# Patient Record
Sex: MALE | Race: WHITE | NOT HISPANIC OR LATINO
[De-identification: names, ages, dates, MRNs, and addresses within clinical notes are randomized per-mention and may not be internally consistent; named-entity substitution may affect disease eponyms.]

---

## 2021-08-19 ENCOUNTER — APPOINTMENT (OUTPATIENT)
Dept: SURGICAL ONCOLOGY | Facility: CLINIC | Age: 71
End: 2021-08-19
Payer: MEDICARE

## 2021-08-19 VITALS
TEMPERATURE: 97.4 F | WEIGHT: 222 LBS | HEART RATE: 92 BPM | SYSTOLIC BLOOD PRESSURE: 137 MMHG | RESPIRATION RATE: 16 BRPM | HEIGHT: 71 IN | OXYGEN SATURATION: 96 % | BODY MASS INDEX: 31.08 KG/M2 | DIASTOLIC BLOOD PRESSURE: 90 MMHG

## 2021-08-19 DIAGNOSIS — Z78.9 OTHER SPECIFIED HEALTH STATUS: ICD-10-CM

## 2021-08-19 PROCEDURE — 99204 OFFICE O/P NEW MOD 45 MIN: CPT

## 2021-08-19 RX ORDER — B-COMPLEX WITH VITAMIN C
TABLET ORAL
Refills: 0 | Status: ACTIVE | COMMUNITY

## 2021-08-19 RX ORDER — CHOLECALCIFEROL (VITAMIN D3) 25 MCG
TABLET ORAL
Refills: 0 | Status: ACTIVE | COMMUNITY

## 2021-08-19 RX ORDER — TAMSULOSIN HYDROCHLORIDE 0.4 MG/1
0.4 CAPSULE ORAL
Refills: 0 | Status: ACTIVE | COMMUNITY

## 2021-08-19 RX ORDER — COLD-HOT PACK
EACH MISCELLANEOUS
Refills: 0 | Status: ACTIVE | COMMUNITY

## 2021-08-19 RX ORDER — FINASTERIDE 5 MG/1
5 TABLET, FILM COATED ORAL
Refills: 0 | Status: ACTIVE | COMMUNITY

## 2021-08-26 ENCOUNTER — OUTPATIENT (OUTPATIENT)
Dept: OUTPATIENT SERVICES | Facility: HOSPITAL | Age: 71
LOS: 1 days | End: 2021-08-26
Payer: COMMERCIAL

## 2021-08-26 ENCOUNTER — RESULT REVIEW (OUTPATIENT)
Age: 71
End: 2021-08-26

## 2021-08-26 DIAGNOSIS — C43.59 MALIGNANT MELANOMA OF OTHER PART OF TRUNK: ICD-10-CM

## 2021-08-26 PROCEDURE — 88321 CONSLTJ&REPRT SLD PREP ELSWR: CPT

## 2021-08-26 NOTE — PHYSICAL EXAM
[Normal] : supple, no neck mass and thyroid not enlarged [Normal Neck Lymph Nodes] : normal neck lymph nodes  [Normal Supraclavicular Lymph Nodes] : normal supraclavicular lymph nodes [Normal Axillary Lymph Nodes] : normal axillary lymph nodes [Normal] : oriented to person, place and time, with appropriate affect [de-identified] : healed right chest wall shave biopsy site with no residual pigmentation

## 2021-08-26 NOTE — CONSULT LETTER
[Dear  ___] : Dear  [unfilled], [Consult Letter:] : I had the pleasure of evaluating your patient, [unfilled]. [Please see my note below.] : Please see my note below. [Consult Closing:] : Thank you very much for allowing me to participate in the care of this patient.  If you have any questions, please do not hesitate to contact me. [Sincerely,] : Sincerely, [FreeTextEntry3] : Cesar Watters MD, MPH, FACS, FSSO\par , [facility] General Surgical Oncology Fellowship\par [facility] [place]\par Associate Professor of Surgery\par [employer] at [facility]

## 2021-08-26 NOTE — HISTORY OF PRESENT ILLNESS
[de-identified] : Mr. DIXON SAUCEDA  is a 70 year  old male  presenting for an initial consultation for newly diagnosed melanoma, referred by Dr. Jean Marie Baez. \par \par Patient underwent a routine skin check with Dr. Baez and underwent a shave biopsy of a right rib cage skin lesion on 2021.  Pathology revealed malignant melanoma, superficial spreading type, 0.4 mm, mitotic rate 0/mm2, non-brisk lymphoid infiltrate, no ulceration or regression.  The lesion extends to the base and peripheral margins of the specimen. Tumor stage: pT1a\par \par Mr. SAUCEDA reports a longstanding history of sun exposure without the use of sunblock. History of sunburns.  Denies any other concerning lesions or masses. Denies bleeding or pruritic moles. Denies pain or constitutional symptoms.\par \par He is retired [employer] (retired in ) and now works for the [employer].  Pt. was involved at [employer]. \par \par PMH: [place] (occasionally wears hearing aids),asthma, DANA (on CPAP), GERD, BPH, BCC (did not require Mohs or excision). \par PSH: None\par Social Hx: , , 2 boys, 3 grandchildren, never a smoker, social alcohol use. \par Family Hx: Father with lung cancer ( @ age 51)- had exposure to asbestos; Son with prostate cancer @ age 45 (A&W); Brother with lung cancer (smoker- );  Another brother with bladder cancer (); Another brother with bladder vs. prostate cancer (); Sister with kidney cancer (A&W).  \par \par Derm: Dr. Jean Marie Baez

## 2021-08-26 NOTE — ASSESSMENT
[FreeTextEntry1] : Mr. DIXON SAUCEDA  is a 70 year  old male with PMHx  BCC, BPH, DANA, Asthma who underwent a right rib cage shave biopsy on 7/30/2021 revealing Malignant melanoma, superficial spreading type, 0.4 mm, mitotic rate 0/mm2, non-brisk lymphoid infiltrate, no ulceration or regression.  The lesion extends to the base and peripheral margins of the specimen. Tumor stage: pT1a\par \par PLAN:\par Wide local excision of right rib cage T1a melanoma.  We discussed the risks, benefits, and alternatives of the procedure with the patient, he expressed understanding and agree to proceed.

## 2021-08-30 LAB — SURGICAL PATHOLOGY STUDY: SIGNIFICANT CHANGE UP

## 2021-09-14 ENCOUNTER — LABORATORY RESULT (OUTPATIENT)
Age: 71
End: 2021-09-14

## 2021-09-14 ENCOUNTER — APPOINTMENT (OUTPATIENT)
Dept: SURGICAL ONCOLOGY | Facility: CLINIC | Age: 71
End: 2021-09-14
Payer: COMMERCIAL

## 2021-09-14 VITALS — DIASTOLIC BLOOD PRESSURE: 95 MMHG | SYSTOLIC BLOOD PRESSURE: 152 MMHG | HEART RATE: 74 BPM | OXYGEN SATURATION: 97 %

## 2021-09-14 PROCEDURE — 14000 TIS TRNFR TRUNK 10 SQ CM/<: CPT

## 2021-09-23 ENCOUNTER — APPOINTMENT (OUTPATIENT)
Dept: SURGICAL ONCOLOGY | Facility: CLINIC | Age: 71
End: 2021-09-23
Payer: MEDICARE

## 2021-09-23 VITALS
HEIGHT: 71 IN | TEMPERATURE: 97.6 F | HEART RATE: 73 BPM | WEIGHT: 224 LBS | DIASTOLIC BLOOD PRESSURE: 89 MMHG | BODY MASS INDEX: 31.36 KG/M2 | SYSTOLIC BLOOD PRESSURE: 148 MMHG | RESPIRATION RATE: 16 BRPM | OXYGEN SATURATION: 95 %

## 2021-09-23 DIAGNOSIS — T81.40XA INFECTION FOLLOWING A PROCEDURE, UNSPECIFIED, INITIAL ENCOUNTER: ICD-10-CM

## 2021-09-23 PROCEDURE — 99024 POSTOP FOLLOW-UP VISIT: CPT

## 2021-09-29 NOTE — PHYSICAL EXAM
[Normal] : well developed, well nourished, in no acute distress [de-identified] : Right chest wall incision with erythema with scant discharge; small area of wound dehiscence to the midline portion of the wound; sutures removed

## 2021-09-29 NOTE — ASSESSMENT
[FreeTextEntry1] : Mr. DIXON SAUCEDA  is a 70 year  old male with PMHx  BCC, BPH, DANA, Asthma who underwent a right rib cage shave biopsy on 7/30/2021 revealing Malignant melanoma, superficial spreading type, 0.4 mm, mitotic rate 0/mm2, non-brisk lymphoid infiltrate, no ulceration or regression.  The lesion extends to the base and peripheral margins of the specimen. Tumor stage: pT1a\par \par ****OFFICE PROCEDURE 9/14/2021-  S/p WLE of right rib cage T1 (0.4 mm) melanoma. \par ***FINAL PATHOLOGY - Scar secondary to previous procedure with no residual melanocytic lesion.  Additional medial and lateral margins negative for tumor. \par \par Postoperative cellulitis \par \par PLAN:\par 1) Augmentin 875/125 mg BID x 10 days \par 2) RTO 10-14 days for re-evaluation\par 3) Continue f/u with dermatology for full body skin checks\par

## 2021-09-29 NOTE — HISTORY OF PRESENT ILLNESS
[de-identified] : Mr. DIXON SAUCEDA  is a 70 year  old male  presenting for a follow up visit. \par Pt. was seen in initial consultation on 2021 for newly diagnosed melanoma, referred by Dr. Jean Marie Baez. \par \par Patient underwent a routine skin check with Dr. Baez and underwent a shave biopsy of a right rib cage skin lesion on 2021.  Pathology revealed malignant melanoma, superficial spreading type, 0.4 mm, mitotic rate 0/mm2, non-brisk lymphoid infiltrate, no ulceration or regression.  The lesion extends to the base and peripheral margins of the specimen. Tumor stage: pT1a\par \par Mr. SAUCEDA reports a longstanding history of sun exposure without the use of sunblock. History of sunburns.  Denies any other concerning lesions or masses. Denies bleeding or pruritic moles. Denies pain or constitutional symptoms.\par \par He is retired [employer] (retired in ) and now works for the [employer].  Pt. was involved at [employer]. \par \par PMH: [place] (occasionally wears hearing aids),asthma, DANA (on CPAP), GERD, BPH, BCC (did not require Mohs or excision). \par PSH: None\par Social Hx: , , 2 boys, 3 grandchildren, never a smoker, social alcohol use. \par Family Hx: Father with lung cancer ( @ age 51)- had exposure to asbestos; Son with prostate cancer @ age 45 (A&W); Brother with lung cancer (smoker- );  Another brother with bladder cancer (); Another brother with bladder vs. prostate cancer (); Sister with kidney cancer (A&W).  \par \par Derm: Dr. Jean Marie Baez\par \par INTERVAL HISTORY:\par ****OFFICE PROCEDURE 2021-  S/p WLE of right rib cage T1 (0.4 mm) melanoma. \par ***FINAL PATHOLOGY - Scar secondary to previous procedure with no residual melanocytic lesion.  Additional medial and lateral margins negative for tumor. \par \par 2021- Denies pain, fever or chills. Reports redness at the incision site.

## 2021-09-29 NOTE — CONSULT LETTER
[Dear  ___] : Dear  [unfilled], [Consult Letter:] : I had the pleasure of evaluating your patient, [unfilled]. [Please see my note below.] : Please see my note below. [Consult Closing:] : Thank you very much for allowing me to participate in the care of this patient.  If you have any questions, please do not hesitate to contact me. [Sincerely,] : Sincerely, [FreeTextEntry3] : Cesar Wattesr MD, MPH, FACS, FSSO\par , [facility] General Surgical Oncology Fellowship\par [facility] [place]\par Associate Professor of Surgery\par [employer] at [facility]

## 2021-09-30 NOTE — PROCEDURE
[Excision Of Lesion] : excision of lesion [Patient] : patient [Risks] : risks [Benefits] : benefits [Alternatives] : alternatives [___ ml Inj] : [unfilled] ~Uml [1%] : 1% [With Epi] : with epinephrine [Jorge] : using Jorge [Excisional: Margin ___mm] : the lesion was excised with a  [unfilled] ~Umm margin in an elliptical fashion [Cautery] : cautery [Vicryl] : Vicryl suture(s) [___ # of Sutures] : [unfilled] [Size: ___-0] : [unfilled]-0 [Running] : running [Sent to Pathology] : the excised lesion was place in buffered formalin and sent for pathology [Tolerated Well] : the patient tolerated the procedure well [No Complications] : there were no complications [___ Week(s)] : in [unfilled] week(s) [de-identified] : melanoma right rib cage [FreeTextEntry5] : right rib cage melanoma 2.5 x 1.8 cm [de-identified] : tissue was undermined and flaps were rotated into the incision to allow for a tension free closure of dermis and epidermis - total area 9.9 sq cm [de-identified] : right rib cage melanoma short superior long lateral

## 2021-10-07 ENCOUNTER — APPOINTMENT (OUTPATIENT)
Dept: SURGICAL ONCOLOGY | Facility: CLINIC | Age: 71
End: 2021-10-07
Payer: MEDICARE

## 2021-10-07 VITALS
HEART RATE: 66 BPM | DIASTOLIC BLOOD PRESSURE: 88 MMHG | HEIGHT: 71 IN | OXYGEN SATURATION: 97 % | BODY MASS INDEX: 31.08 KG/M2 | SYSTOLIC BLOOD PRESSURE: 148 MMHG | RESPIRATION RATE: 16 BRPM | WEIGHT: 222 LBS | TEMPERATURE: 97.8 F

## 2021-10-07 PROCEDURE — 99024 POSTOP FOLLOW-UP VISIT: CPT

## 2021-10-14 NOTE — PHYSICAL EXAM
[Normal] : well developed, well nourished, in no acute distress [de-identified] : Right chest wall incision with small area of wound dehiscence to the midline portion of the wound; no redness or discharge

## 2021-10-14 NOTE — HISTORY OF PRESENT ILLNESS
[de-identified] : Mr. DIXON SAUCEDA  is a 70 year  old male  presenting for a follow up visit. \par Pt. was seen in initial consultation on 2021 for newly diagnosed melanoma, referred by Dr. Jean Marie Baez. \par \par Patient underwent a routine skin check with Dr. Baez and underwent a shave biopsy of a right rib cage skin lesion on 2021.  Pathology revealed malignant melanoma, superficial spreading type, 0.4 mm, mitotic rate 0/mm2, non-brisk lymphoid infiltrate, no ulceration or regression.  The lesion extends to the base and peripheral margins of the specimen. Tumor stage: pT1a\par \par Mr. SAUCEDA reports a longstanding history of sun exposure without the use of sunblock. History of sunburns.  Denies any other concerning lesions or masses. Denies bleeding or pruritic moles. Denies pain or constitutional symptoms.\par \par He is retired [employer] (retired in ) and now works for the [employer].  Pt. was involved at [facility] [employer] [facility]. \par \par PMH: [place] (occasionally wears hearing aids),asthma, DANA (on CPAP), GERD, BPH, BCC (did not require Mohs or excision). \par PSH: None\par Social Hx: , , 2 boys, 3 grandchildren, never a smoker, social alcohol use. \par Family Hx: Father with lung cancer ( @ age 51)- had exposure to asbestos; Son with prostate cancer @ age 45 (A&W); Brother with lung cancer (smoker- );  Another brother with bladder cancer (); Another brother with bladder vs. prostate cancer (); Sister with kidney cancer (A&W).  \par \par Derm: Dr. Jean Marie Baez\par \par INTERVAL HISTORY:\par ****OFFICE PROCEDURE 2021-  S/p WLE of right rib cage T1 (0.4 mm) melanoma. \par ***FINAL PATHOLOGY - Scar secondary to previous procedure with no residual melanocytic lesion.  Additional medial and lateral margins negative for tumor. \par \par 2021- Denies pain, fever or chills. Reports redness at the incision site. \par \par 10/7/2021- Completed Augmentin with improvement.  States there is an open area in the wound which drains scant yellow drainage. Denies fever or chills.

## 2021-10-14 NOTE — ASSESSMENT
[FreeTextEntry1] : Mr. DIXON SAUCEDA  is a 70 year  old male with PMHx  BCC, BPH, DANA, Asthma who underwent a right rib cage shave biopsy on 7/30/2021 revealing Malignant melanoma, superficial spreading type, 0.4 mm, mitotic rate 0/mm2, non-brisk lymphoid infiltrate, no ulceration or regression.  The lesion extends to the base and peripheral margins of the specimen. Tumor stage: pT1a\par \par ****OFFICE PROCEDURE 9/14/2021-  S/p WLE of right rib cage T1 (0.4 mm) melanoma. \par ***FINAL PATHOLOGY - Scar secondary to previous procedure with no residual melanocytic lesion.  Additional medial and lateral margins negative for tumor. \par \par Postoperative cellulitis - now resolved. \par Small area of wound dehiscence. \par \par PLAN:\par 1) RTO in 3 months then Q6M\par 2) Continue f/u with dermatology for full body skin checks\par

## 2021-10-14 NOTE — CONSULT LETTER
[Dear  ___] : Dear  [unfilled], [Courtesy Letter:] : I had the pleasure of seeing your patient, [unfilled], in my office today. [Please see my note below.] : Please see my note below. [Consult Closing:] : Thank you very much for allowing me to participate in the care of this patient.  If you have any questions, please do not hesitate to contact me. [Sincerely,] : Sincerely, [FreeTextEntry3] : Cesar Watters MD, MPH, FACS, FSSO\par , [facility] General Surgical Oncology Fellowship\par [facility] [place]\par Associate Professor of Surgery\par [employer] at [facility]

## 2022-01-06 ENCOUNTER — APPOINTMENT (OUTPATIENT)
Dept: SURGICAL ONCOLOGY | Facility: CLINIC | Age: 72
End: 2022-01-06
Payer: COMMERCIAL

## 2022-01-06 VITALS
OXYGEN SATURATION: 95 % | WEIGHT: 224 LBS | HEIGHT: 71 IN | BODY MASS INDEX: 31.36 KG/M2 | RESPIRATION RATE: 16 BRPM | TEMPERATURE: 97.4 F | SYSTOLIC BLOOD PRESSURE: 150 MMHG | HEART RATE: 65 BPM | DIASTOLIC BLOOD PRESSURE: 90 MMHG

## 2022-01-06 PROCEDURE — 99213 OFFICE O/P EST LOW 20 MIN: CPT

## 2022-01-06 NOTE — PHYSICAL EXAM
[Normal] : supple, no neck mass and thyroid not enlarged [Normal Neck Lymph Nodes] : normal neck lymph nodes  [Normal Supraclavicular Lymph Nodes] : normal supraclavicular lymph nodes [Normal Axillary Lymph Nodes] : normal axillary lymph nodes [Normal] : oriented to person, place and time, with appropriate affect [de-identified] : Healed, hypertrophic right chest wall incision with no evidence of recurrence

## 2022-01-06 NOTE — CONSULT LETTER
[Dear  ___] : Dear  [unfilled], [Courtesy Letter:] : I had the pleasure of seeing your patient, [unfilled], in my office today. [Please see my note below.] : Please see my note below. [Consult Closing:] : Thank you very much for allowing me to participate in the care of this patient.  If you have any questions, please do not hesitate to contact me. [Sincerely,] : Sincerely, [FreeTextEntry3] : Cesar Watters MD, MPH, FACS, FSSO\par , [facility] General Surgical Oncology Fellowship\par [facility] [place]\par Associate Professor of Surgery\par [employer] at [facility]

## 2022-01-06 NOTE — HISTORY OF PRESENT ILLNESS
[de-identified] : Mr. DIXON SAUCEDA  is a 71 year  old male  presenting for a 3 month follow up visit. \par Pt. was seen in initial consultation on 2021 for newly diagnosed melanoma, referred by Dr. Jean Marie Baez. \par \par Patient underwent a routine skin check with Dr. Baez and underwent a shave biopsy of a right rib cage skin lesion on 2021.  Pathology revealed malignant melanoma, superficial spreading type, 0.4 mm, mitotic rate 0/mm2, non-brisk lymphoid infiltrate, no ulceration or regression.  The lesion extends to the base and peripheral margins of the specimen. Tumor stage: pT1a\par \par Mr. SAUCEDA reports a longstanding history of sun exposure without the use of sunblock. History of sunburns.  Denies any other concerning lesions or masses. Denies bleeding or pruritic moles. Denies pain or constitutional symptoms.\par \par He is retired [employer] (retired in ) and now works for the [employer].  Pt. was involved at [facility]. \par \par PMH: [place] (occasionally wears hearing aids),asthma, DANA (on CPAP), GERD, BPH, BCC (did not require Mohs or excision). \par PSH: None\par Social Hx: , , 2 boys, 3 grandchildren, never a smoker, social alcohol use. \par Family Hx: Father with lung cancer ( @ age 51)- had exposure to asbestos; Son with prostate cancer @ age 45 (A&W); Brother with lung cancer (smoker- );  Another brother with bladder cancer (); Another brother with bladder vs. prostate cancer (); Sister with kidney cancer (A&W).  \par \par Derm: Dr. Jean Marie Baez\par \par INTERVAL HISTORY:\par ****OFFICE PROCEDURE 2021-  S/p WLE of right rib cage T1 (0.4 mm) melanoma. \par ***FINAL PATHOLOGY - Scar secondary to previous procedure with no residual melanocytic lesion.  Additional medial and lateral margins negative for tumor. \par \par 2021- Denies pain, fever or chills. Reports redness at the incision site. \par \par 10/7/2021- Completed Augmentin with improvement.  States there is an open area in the wound which drains scant yellow drainage. Denies fever or chills. \par \par 2022- Pt. is scheduled to see Dr. Baez next month for a full body skin check.   Denies new skin lesions or masses. No bleeding or pruritic moles.  Doing well.

## 2022-01-06 NOTE — ASSESSMENT
[FreeTextEntry1] : Mr. DIXON SAUCEDA  is a 71 year  old male with PMHx  BCC, BPH, DANA, Asthma who underwent a right rib cage shave biopsy on 7/30/2021 revealing Malignant melanoma, superficial spreading type, 0.4 mm, mitotic rate 0/mm2, non-brisk lymphoid infiltrate, no ulceration or regression.  The lesion extends to the base and peripheral margins of the specimen. Tumor stage: pT1a\par \par ****OFFICE PROCEDURE 9/14/2021-  S/p WLE of right rib cage T1 (0.4 mm) melanoma. \par ***FINAL PATHOLOGY - Scar secondary to previous procedure with no residual melanocytic lesion.  Additional medial and lateral margins negative for tumor. \par \par Postoperative cellulitis - now resolved. \par No evidence of recurrence \par \par PLAN:\par 1) RTO Q6M x 5 years \par 2) Continue f/u with dermatology for full body skin checks (scheduled to see Dr. Baez next month). \par

## 2022-06-23 ENCOUNTER — APPOINTMENT (OUTPATIENT)
Dept: OTOLARYNGOLOGY | Facility: CLINIC | Age: 72
End: 2022-06-23
Payer: MEDICARE

## 2022-06-23 VITALS
HEIGHT: 71 IN | DIASTOLIC BLOOD PRESSURE: 87 MMHG | WEIGHT: 220 LBS | SYSTOLIC BLOOD PRESSURE: 157 MMHG | HEART RATE: 52 BPM | BODY MASS INDEX: 30.8 KG/M2

## 2022-06-23 DIAGNOSIS — H69.82 OTHER SPECIFIED DISORDERS OF EUSTACHIAN TUBE, LEFT EAR: ICD-10-CM

## 2022-06-23 DIAGNOSIS — H90.3 SENSORINEURAL HEARING LOSS, BILATERAL: ICD-10-CM

## 2022-06-23 PROCEDURE — 99203 OFFICE O/P NEW LOW 30 MIN: CPT

## 2022-06-23 PROCEDURE — 92557 COMPREHENSIVE HEARING TEST: CPT

## 2022-06-23 PROCEDURE — 92567 TYMPANOMETRY: CPT

## 2022-06-23 RX ORDER — VITAMIN B COMPLEX
CAPSULE ORAL
Refills: 0 | Status: ACTIVE | COMMUNITY

## 2022-06-23 RX ORDER — NACL/NAHCO3/HYALURON SOD/ALOE 0.9 %
SPRAY GEL (ML) NASAL
Qty: 90 | Refills: 0 | Status: ACTIVE | COMMUNITY
Start: 2022-04-15

## 2022-06-23 RX ORDER — ALPRAZOLAM 0.25 MG/1
0.25 TABLET ORAL
Qty: 30 | Refills: 0 | Status: ACTIVE | COMMUNITY
Start: 2022-06-16

## 2022-06-23 RX ORDER — ASCORBIC ACID 500 MG
TABLET ORAL
Refills: 0 | Status: ACTIVE | COMMUNITY

## 2022-06-23 RX ORDER — AMOXICILLIN AND CLAVULANATE POTASSIUM 875; 125 MG/1; MG/1
875-125 TABLET, COATED ORAL
Qty: 20 | Refills: 0 | Status: DISCONTINUED | COMMUNITY
Start: 2021-09-23 | End: 2022-06-23

## 2022-06-23 RX ORDER — OMEPRAZOLE 20 MG/1
TABLET, DELAYED RELEASE ORAL
Refills: 0 | Status: ACTIVE | COMMUNITY

## 2022-06-23 RX ORDER — FLUTICASONE PROPIONATE 50 MCG
SPRAY, SUSPENSION NASAL
Refills: 0 | Status: ACTIVE | COMMUNITY

## 2022-06-23 RX ORDER — TADALAFIL 5 MG/1
5 TABLET ORAL
Qty: 30 | Refills: 0 | Status: ACTIVE | COMMUNITY
Start: 2022-06-16

## 2022-06-23 RX ORDER — PSYLLIUM HUSK 0.4 G
CAPSULE ORAL
Refills: 0 | Status: ACTIVE | COMMUNITY

## 2022-06-23 NOTE — REVIEW OF SYSTEMS
[Seasonal Allergies] : seasonal allergies [Post Nasal Drip] : post nasal drip [Ear Pain] : ear pain [Ear Itch] : ear itch [Hearing Loss] : hearing loss [Ear Noises] : ear noises [Nasal Congestion] : nasal congestion [Problem Snoring] : problem snoring [Sinus Pain] : sinus pain [Sinus Pressure] : sinus pressure [Snoring With Pauses] : snoring with pauses [Throat Clearing] : throat clearing [Heartburn] : heartburn [Joint Pain] : joint pain [Negative] : Heme/Lymph [As Noted in HPI] : as noted in HPI [FreeTextEntry1] : daytime sleepiness

## 2022-06-23 NOTE — DATA REVIEWED
[de-identified] : \par -TYMPS: TYPE A AU (ETF WNL AU)\par -HEARING ESSENTIALLY -1000 HZ, SLOPING TO A MODERATE TO SEVERE SNHL 1372-6375 HZ AU \par RECS: 1) ENT F/U 2)RE-EVAL AS PER MD 3)CONTINUE USE OF BINAURAL AMP/ HAC AT OUTSIDE FACILITY

## 2022-06-23 NOTE — ASSESSMENT
[FreeTextEntry1] :  WNL sloping to mod to severe SNHL w type A AU\par f/u at [facility] re: HA\par \par advised to use flonase daily\par f/u prn failure to imorive

## 2022-06-23 NOTE — HISTORY OF PRESENT ILLNESS
[de-identified] : 71 yr old male with bubbling noise AD for 7-10 days until yesterday. Feels better except for residual pressure\par aided AU from [employer]\par denies recent URI, allergy flare up\par uses Flonase prn, NS daily\par -otalgia, dizzy\par \par +Qtips\par \par -hx otitis, noise exp, head trauma, FH\par \par

## 2022-07-14 ENCOUNTER — APPOINTMENT (OUTPATIENT)
Dept: SURGICAL ONCOLOGY | Facility: CLINIC | Age: 72
End: 2022-07-14

## 2022-08-10 ENCOUNTER — APPOINTMENT (OUTPATIENT)
Dept: ORTHOPEDIC SURGERY | Facility: CLINIC | Age: 72
End: 2022-08-10

## 2022-08-10 VITALS — WEIGHT: 212 LBS | HEIGHT: 71 IN | BODY MASS INDEX: 29.68 KG/M2

## 2022-08-10 DIAGNOSIS — Z00.00 ENCOUNTER FOR GENERAL ADULT MEDICAL EXAMINATION W/OUT ABNORMAL FINDINGS: ICD-10-CM

## 2022-08-10 DIAGNOSIS — Z78.9 OTHER SPECIFIED HEALTH STATUS: ICD-10-CM

## 2022-08-10 PROCEDURE — 73030 X-RAY EXAM OF SHOULDER: CPT | Mod: RT

## 2022-08-10 PROCEDURE — 99204 OFFICE O/P NEW MOD 45 MIN: CPT

## 2022-08-10 PROCEDURE — 73010 X-RAY EXAM OF SHOULDER BLADE: CPT | Mod: RT

## 2022-08-10 RX ORDER — METHYLPREDNISOLONE 4 MG/1
4 TABLET ORAL
Qty: 1 | Refills: 0 | Status: ACTIVE | COMMUNITY
Start: 2022-08-10 | End: 1900-01-01

## 2022-08-10 NOTE — ASSESSMENT
[FreeTextEntry1] : We reviewed the findings and his options.\par PT is prescribed.\par MDP is prescribed. \par If symptoms persist, an MRI may be considered. \par His questions were answered.\par \par Patient seen by Dayday Mathew M.D.\par Entered by Mara Ly acting as scribe.

## 2022-08-10 NOTE — PHYSICAL EXAM
[Right] : right shoulder [Left] : left shoulder [Mild] : mild [5 ___] : forward flexion 5[unfilled]/5 [5___] : external rotation 5[unfilled]/5 [Sitting] : sitting [] : no ecchymosis [FreeTextEntry9] : IR to T10. [TWNoteComboBox6] : internal rotation L1 [TWNoteComboBox4] : passive forward flexion 165 degrees [de-identified] : external rotation 75 degrees

## 2022-08-10 NOTE — IMAGING
[Right] : right shoulder [FreeTextEntry1] : The GH joint is OK.  There are AC and GT changes. [FreeTextEntry5] : There is a Type II acromion.

## 2022-08-10 NOTE — HISTORY OF PRESENT ILLNESS
[4] : 4 [5] : 5 [Dull/Aching] : dull/aching [Intermittent] : intermittent [Sleep] : sleep [Retired] : Work status: retired [de-identified] : Right shoulder pain for 1 month, no known injury. [] : no [FreeTextEntry1] : Right shoulder [de-identified] : lifting

## 2022-08-10 NOTE — CONSULT LETTER
[Dear  ___] : Dear  [unfilled], [Consult Letter:] : I had the pleasure of evaluating your patient, [unfilled]. [Please see my note below.] : Please see my note below. [Consult Closing:] : Thank you very much for allowing me to participate in the care of this patient.  If you have any questions, please do not hesitate to contact me. [Sincerely,] : Sincerely, [FreeTextEntry3] : Dayday Berrios M.D.\par Shoulder Surgery

## 2022-08-11 ENCOUNTER — APPOINTMENT (OUTPATIENT)
Dept: SURGICAL ONCOLOGY | Facility: CLINIC | Age: 72
End: 2022-08-11

## 2022-08-11 VITALS
RESPIRATION RATE: 16 BRPM | SYSTOLIC BLOOD PRESSURE: 144 MMHG | WEIGHT: 211 LBS | HEIGHT: 71 IN | OXYGEN SATURATION: 96 % | DIASTOLIC BLOOD PRESSURE: 80 MMHG | TEMPERATURE: 97.4 F | BODY MASS INDEX: 29.54 KG/M2 | HEART RATE: 77 BPM

## 2022-08-11 PROCEDURE — 99213 OFFICE O/P EST LOW 20 MIN: CPT

## 2022-09-21 ENCOUNTER — APPOINTMENT (OUTPATIENT)
Dept: ORTHOPEDIC SURGERY | Facility: CLINIC | Age: 72
End: 2022-09-21

## 2022-09-21 VITALS — BODY MASS INDEX: 29.54 KG/M2 | WEIGHT: 211 LBS | HEIGHT: 71 IN

## 2022-09-21 PROCEDURE — 99214 OFFICE O/P EST MOD 30 MIN: CPT

## 2022-09-21 NOTE — HISTORY OF PRESENT ILLNESS
[Dull/Aching] : dull/aching [Retired] : Work status: retired [4] : 4 [5] : 5 [Intermittent] : intermittent [Sleep] : sleep [de-identified] : Returns for f/up Right shoulder pain since July, no known injury.  He has had 8 sessions of PT, motion and night symptoms improving.  [] : no [FreeTextEntry1] : Right shoulder [de-identified] : lifting [de-identified] : Physical therapy 2-3 x a week, HEP.

## 2022-09-21 NOTE — ASSESSMENT
[FreeTextEntry1] : We reviewed the findings and his options.\par Continue PT as prescribed. He reports 50% improvement in pain and symptoms.\par Alleve bid with a meal x 2 weeks.\par MDP 8/10/22 with +/- relief. \par If symptoms persist, an MRI may be considered. \par His questions were answered.\par \par Patient seen by Dayday Mathew M.D.\par Progress note completed by DANA Loyd

## 2022-09-21 NOTE — REASON FOR VISIT
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  He has been doing a lot of work, including lifting and carrying.  Sleeping can be sore.  Reaching is uncomfortable.  OTC meds don't help much.  No numbness. With PT sessions he feels 50% better.

## 2022-09-21 NOTE — PHYSICAL EXAM
[Right] : right shoulder [Mild] : mild [4 ___] : forward flexion 4[unfilled]/5 [4___] : external rotation 4[unfilled]/5 [Left] : left shoulder [Sitting] : sitting [] : no ecchymosis [FreeTextEntry9] : IR to T10. [TWNoteComboBox6] : False [TWNoteComboBox4] : passive forward flexion 165 degrees [de-identified] : external rotation 75 degrees

## 2022-09-26 NOTE — CONSULT LETTER
[FreeTextEntry3] : Cesar Watters MD, MPH, FACS, FSSO\par , [facility] General Surgical Oncology Fellowship\par [facility] [place]\par Associate Professor of Surgery\par [employer] at [facility]

## 2022-09-26 NOTE — HISTORY OF PRESENT ILLNESS
[de-identified] : Mr. DIXON SAUCEDA  is a 71 year  old male  presenting for a 3 month follow up visit. \par Pt. was seen in initial consultation on 2021 for newly diagnosed melanoma, referred by Dr. Jean Marie Baez. \par \par Patient underwent a routine skin check with Dr. Baez and underwent a shave biopsy of a right rib cage skin lesion on 2021.  Pathology revealed malignant melanoma, superficial spreading type, 0.4 mm, mitotic rate 0/mm2, non-brisk lymphoid infiltrate, no ulceration or regression.  The lesion extends to the base and peripheral margins of the specimen. Tumor stage: pT1a\par \par Mr. SAUCEDA reports a longstanding history of sun exposure without the use of sunblock. History of sunburns.  Denies any other concerning lesions or masses. Denies bleeding or pruritic moles. Denies pain or constitutional symptoms.\par \par He is retired [employer] (retired in ) and now works for the [employer].  Pt. was involved at [employer]. \par \par PMH: [place] (occasionally wears hearing aids),asthma, [place] (on CPAP), GERD, BPH, BCC (did not require Mohs or excision). \par PSH: None\par Social Hx: , , 2 boys, 3 grandchildren, never a smoker, social alcohol use. \par Family Hx: Father with lung cancer ( @ age 51)- had exposure to asbestos; Son with prostate cancer @ age 45 (A&W); Brother with lung cancer (smoker- );  Another brother with bladder cancer (); Another brother with bladder vs. prostate cancer (); Sister with kidney cancer (A&W).  \par \par Derm: Dr. Jean Marie Baez\par \par INTERVAL HISTORY:\par ****OFFICE PROCEDURE 2021-  S/p WLE of right rib cage T1 (0.4 mm) melanoma. \par ***FINAL PATHOLOGY - Scar secondary to previous procedure with no residual melanocytic lesion.  Additional medial and lateral margins negative for tumor. \par \par 2021- Denies pain, fever or chills. Reports redness at the incision site. \par \par 10/7/2021- Completed Augmentin with improvement.  States there is an open area in the wound which drains scant yellow drainage. Denies fever or chills. \par \par 2022- Pt. is scheduled to see Dr. Baez next month for a full body skin check.   Denies new skin lesions or masses. No bleeding or pruritic moles.  Doing well. \par \par 22- Continues to see derm every 4 months, last visit in 2022, no new biopsies taken.  Denies any new or changing skin lesions.

## 2022-10-14 ENCOUNTER — APPOINTMENT (OUTPATIENT)
Dept: ORTHOPEDIC SURGERY | Facility: CLINIC | Age: 72
End: 2022-10-14

## 2022-10-14 PROCEDURE — 99214 OFFICE O/P EST MOD 30 MIN: CPT

## 2022-10-14 NOTE — PHYSICAL EXAM
[Right] : right shoulder [4 ___] : forward flexion 4[unfilled]/5 [4___] : external rotation 4[unfilled]/5 [Left] : left shoulder [Sitting] : sitting [Moderate] : moderate [] : no ecchymosis [FreeTextEntry9] : IR to T10. [TWNoteComboBox6] : internal rotation L1 [TWNoteComboBox4] : passive forward flexion 165 degrees [de-identified] : external rotation 75 degrees

## 2022-10-14 NOTE — REASON FOR VISIT
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  He has been doing a lot of work, including lifting and carrying.  Sleeping can be sore.  Reaching is uncomfortable.  OTC meds don't help much.  No numbness.  He did OK with PT, but now feels the same.  He has done the MDP and Aleve.  He continues to have limitations.

## 2022-10-14 NOTE — ASSESSMENT
[FreeTextEntry1] : An MRI is planned.\par He has lost some of the gains he originally achieved.\par HEP planned.\par He may continue the Aleve.\par Stop PT for now.

## 2022-10-18 ENCOUNTER — FORM ENCOUNTER (OUTPATIENT)
Age: 72
End: 2022-10-18

## 2022-10-19 ENCOUNTER — APPOINTMENT (OUTPATIENT)
Dept: MRI IMAGING | Facility: CLINIC | Age: 72
End: 2022-10-19

## 2022-10-19 PROCEDURE — 73221 MRI JOINT UPR EXTREM W/O DYE: CPT | Mod: RT,MH

## 2022-10-24 ENCOUNTER — APPOINTMENT (OUTPATIENT)
Dept: ORTHOPEDIC SURGERY | Facility: CLINIC | Age: 72
End: 2022-10-24

## 2022-10-31 ENCOUNTER — APPOINTMENT (OUTPATIENT)
Dept: ORTHOPEDIC SURGERY | Facility: CLINIC | Age: 72
End: 2022-10-31

## 2022-10-31 VITALS — WEIGHT: 214 LBS | HEIGHT: 71 IN | BODY MASS INDEX: 29.96 KG/M2

## 2022-10-31 PROCEDURE — 20611 DRAIN/INJ JOINT/BURSA W/US: CPT | Mod: RT

## 2022-10-31 PROCEDURE — 99214 OFFICE O/P EST MOD 30 MIN: CPT | Mod: 25

## 2022-10-31 NOTE — ASSESSMENT
[FreeTextEntry1] : We reviewed the MRI findings. \par A SA injection is indicated today. \par He will follow up in 4-6 weeks. \par He will follow up with his wife to discuss surgical options. \par Questions answered. \par \par Patient seen by Dayday Mathew M.D.\par Entered by Mara Ly acting as scribe. \par \par \par Procedure Name: Large Joint Injection / Aspiration: Depomedrol, Lidocaine and Guidance Ultrasound\par \par Large Joint Injection was performed because of pain and inflammation.\par Depomedrol: An injection of Depomedrol 40 mg , 2 cc.\par Lidocaine: An injection of Lidocaine 1 mg , 13 cc.\par \par Medication was injected in the right subacromial space. Patient has tried OTC's including aspirin, Ibuprofen, Aleve etc or prescription NSAIDS, and/or exercises at home and/ or physical therapy without satisfactory response. After verbal consent using sterile preparation and technique. The risks, benefits, and alternatives to cortisone injection were explained in full to the patient. Risks outlined include but are not limited to infection, sepsis, bleeding, scarring, skin discoloration, temporary increase in pain, syncopal episode, failure to resolve symptoms, allergic reaction, symptom recurrence, and elevation of blood sugar in diabetics. Patient understood the risks. All questions were answered. After discussion of options, patient requested an injection. Oral informed consent was obtained and sterile prep was done of the injection site. Sterile technique was utilized for the procedure including the preparation of the solutions used for the injection. Patient tolerated the procedure well. Advised to ice the injection site this evening. Prep with betadine locally to site. Sterile technique used. Patient tolerated procedure well. Post Procedure Instructions: Patient was advised to call if redness, pain, or fever occur and apply ice for 15 min. out of every hour for the next 12-24 hours as tolerated. Patient was advised to rest the joint(s) for 3 days. Ultrasound Guidance was used for the following reasons: for precise injection in area of tear. Visualization of the needle and placement of injection was performed without complication.

## 2022-10-31 NOTE — DATA REVIEWED
[FreeTextEntry1] : MRI 10/19/22: \par The biceps has subluxed into a partial subscapularis tear. The supraspinatus has a high-grade partial bursal tear. The muscle is good. There are AC changes.

## 2022-10-31 NOTE — HISTORY OF PRESENT ILLNESS
[5] : 5 [0] : 0 [de-identified] : pt is here today for a test follow up for his right shoulder. pt states his pain is improving  [FreeTextEntry1] : right shoulder  [de-identified] : home exercises and icing

## 2022-10-31 NOTE — PHYSICAL EXAM
[Right] : right shoulder [Moderate] : moderate [4 ___] : forward flexion 4[unfilled]/5 [4___] : external rotation 4[unfilled]/5 [Left] : left shoulder [Sitting] : sitting [] : no ecchymosis [FreeTextEntry9] : IR to T10. [TWNoteComboBox6] : internal rotation L1 [TWNoteComboBox4] : passive forward flexion 165 degrees [de-identified] : external rotation 75 degrees

## 2022-11-28 ENCOUNTER — APPOINTMENT (OUTPATIENT)
Dept: ORTHOPEDIC SURGERY | Facility: CLINIC | Age: 72
End: 2022-11-28

## 2022-11-28 VITALS — WEIGHT: 216 LBS | HEIGHT: 71 IN | BODY MASS INDEX: 30.24 KG/M2

## 2022-11-28 PROCEDURE — 99214 OFFICE O/P EST MOD 30 MIN: CPT

## 2022-11-28 NOTE — ASSESSMENT
[FreeTextEntry1] : We discussed his course. \par PT is prescribed. \par If symptoms persist, an injection could be considered. \par There are surgical options. \par His and his wife's questions answered. \par \par Patient seen by Dayday Mathew M.D.\par Entered by Mara Ly acting as scribe. \par \par \par

## 2022-11-28 NOTE — PHYSICAL EXAM
[Right] : right shoulder [Moderate] : moderate [4 ___] : forward flexion 4[unfilled]/5 [4___] : external rotation 4[unfilled]/5 [Left] : left shoulder [Sitting] : sitting [] : no ecchymosis [FreeTextEntry9] : IR to T10. [TWNoteComboBox6] : internal rotation L1 [TWNoteComboBox4] : passive forward flexion 165 degrees [de-identified] : external rotation 75 degrees

## 2022-11-28 NOTE — REASON FOR VISIT
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  He has been doing a lot of work, including lifting and carrying.  Sleeping can be sore.  Reaching is uncomfortable.  OTC meds don't help much.  No numbness.  He did OK with PT, but now feels the same.  He has done the MDP and Aleve.  He continues to have limitations. The SA injection from 10/31/22 helped for a few weeks. He has increased his activity levels recently. His wife is here. He feels about 50% better.

## 2022-11-28 NOTE — HISTORY OF PRESENT ILLNESS
[2] : 2 [0] : 0 [de-identified] : pt is here today for a follow up for his right shoulder. pt states his pain is similar to last visit, pt states the cortisone injection provided him with relief for a few weeks. pt states the pain is mainly with the overhead motion  [FreeTextEntry1] : right shoulder  [de-identified] : home exercises

## 2023-03-09 ENCOUNTER — APPOINTMENT (OUTPATIENT)
Dept: SURGICAL ONCOLOGY | Facility: CLINIC | Age: 73
End: 2023-03-09
Payer: COMMERCIAL

## 2023-03-16 ENCOUNTER — APPOINTMENT (OUTPATIENT)
Dept: SURGICAL ONCOLOGY | Facility: CLINIC | Age: 73
End: 2023-03-16
Payer: COMMERCIAL

## 2023-03-16 VITALS
WEIGHT: 225 LBS | DIASTOLIC BLOOD PRESSURE: 80 MMHG | SYSTOLIC BLOOD PRESSURE: 138 MMHG | TEMPERATURE: 97.9 F | HEIGHT: 71 IN | HEART RATE: 100 BPM | OXYGEN SATURATION: 96 % | BODY MASS INDEX: 31.5 KG/M2

## 2023-03-16 DIAGNOSIS — C43.59 MALIGNANT MELANOMA OF OTHER PART OF TRUNK: ICD-10-CM

## 2023-03-16 PROCEDURE — 99213 OFFICE O/P EST LOW 20 MIN: CPT

## 2023-03-27 NOTE — ASSESSMENT
[FreeTextEntry1] : Mr. DIXON SAUCEDA  is a 71 year  old male with PMHx  BCC, BPH, DANA, Asthma who underwent a right rib cage shave biopsy on 7/30/2021 revealing Malignant melanoma, superficial spreading type, 0.4 mm, mitotic rate 0/mm2, non-brisk lymphoid infiltrate, no ulceration or regression.  The lesion extends to the base and peripheral margins of the specimen. Tumor stage: pT1a\par \par ****OFFICE PROCEDURE 9/14/2021-  S/p WLE of right rib cage T1 (0.4 mm) melanoma. \par ***FINAL PATHOLOGY - Scar secondary to previous procedure with no residual melanocytic lesion.  Additional medial and lateral margins negative for tumor. \par \par Postoperative cellulitis - now resolved. \par No evidence of recurrence \par \par PLAN:\par 1) RTO in 6 months\par 2) Continue f/u with dermatology for full body skin checks, 1 lesion suspicious planning for excision per dermatologist.\par

## 2023-03-27 NOTE — HISTORY OF PRESENT ILLNESS
[de-identified] : Mr. DIXON SAUCEDA  is a 72 year  old male  presenting for a follow up visit. \par Pt. was seen in initial consultation on 2021 for newly diagnosed melanoma, referred by Dr. Jean Marie Baez. \par \par Patient underwent a routine skin check with Dr. Baez and underwent a shave biopsy of a right rib cage skin lesion on 2021.  Pathology revealed malignant melanoma, superficial spreading type, 0.4 mm, mitotic rate 0/mm2, non-brisk lymphoid infiltrate, no ulceration or regression.  The lesion extends to the base and peripheral margins of the specimen. Tumor stage: pT1a\par \par Mr. SAUCEDA reports a longstanding history of sun exposure without the use of sunblock. History of sunburns.  Denies any other concerning lesions or masses. Denies bleeding or pruritic moles. Denies pain or constitutional symptoms.\par \par He is retired [employer] (retired in ) and now works for the [employer].  Pt. was involved at [facility]. \par \par PMH: [place] (occasionally wears hearing aids),asthma, DANA (on CPAP), GERD, BPH, BCC (did not require Mohs or excision). \par PSH: None\par Social Hx: , , 2 boys, 3 grandchildren, never a smoker, social alcohol use. \par Family Hx: Father with lung cancer ( @ age 51)- had exposure to asbestos; Son with prostate cancer @ age 45 (A&W); Brother with lung cancer (smoker- );  Another brother with bladder cancer (); Another brother with bladder vs. prostate cancer (); Sister with kidney cancer (A&W).  \par \par Derm: Dr. Jean Marie Baez\par \par INTERVAL HISTORY:\par ****OFFICE PROCEDURE 2021-  S/p WLE of right rib cage T1 (0.4 mm) melanoma. \par ***FINAL PATHOLOGY - Scar secondary to previous procedure with no residual melanocytic lesion.  Additional medial and lateral margins negative for tumor. \par \par 2021- Denies pain, fever or chills. Reports redness at the incision site. \par \par 10/7/2021- Completed Augmentin with improvement.  States there is an open area in the wound which drains scant yellow drainage. Denies fever or chills. \par \par 2022- Pt. is scheduled to see Dr. Baez next month for a full body skin check.   Denies new skin lesions or masses. No bleeding or pruritic moles.  Doing well. \par \par 22- Continues to see derm every 4 months, last visit in 2022, no new biopsies taken.  Denies any new or changing skin lesions.\par \par 3/16/23- . Denies bleeding or pruritic moles. Denies pain or constitutional changes. Continues to follow up with dermatology with 5 new biopsies taken. 1 lesion suspicious planning for excision per dermatologist

## 2023-03-27 NOTE — CONSULT LETTER
[Dear  ___] : Dear  [unfilled], [Courtesy Letter:] : I had the pleasure of seeing your patient, [unfilled], in my office today. [Consult Closing:] : Thank you very much for allowing me to participate in the care of this patient.  If you have any questions, please do not hesitate to contact me. [Please see my note below.] : Please see my note below. [Sincerely,] : Sincerely, [FreeTextEntry3] : Cesar Watters MD, MPH, FACS, FSSO\par , [facility] General Surgical Oncology Fellowship\par [facility] [place]\par Associate Professor of Surgery\par [employer] [facility]

## 2023-03-27 NOTE — PHYSICAL EXAM
[Normal] : supple, no neck mass and thyroid not enlarged [Normal Neck Lymph Nodes] : normal neck lymph nodes  [Normal Supraclavicular Lymph Nodes] : normal supraclavicular lymph nodes [Normal Axillary Lymph Nodes] : normal axillary lymph nodes [Normal] : oriented to person, place and time, with appropriate affect [de-identified] : Healed, hypertrophic right chest wall incision with no evidence of recurrence, 5 new biopsys

## 2023-03-27 NOTE — HISTORY OF PRESENT ILLNESS
[de-identified] : Mr. DIXON SAUCEDA  is a 72 year  old male  presenting for a follow up visit. \par Pt. was seen in initial consultation on 2021 for newly diagnosed melanoma, referred by Dr. Jean Marie Baez. \par \par Patient underwent a routine skin check with Dr. Baez and underwent a shave biopsy of a right rib cage skin lesion on 2021.  Pathology revealed malignant melanoma, superficial spreading type, 0.4 mm, mitotic rate 0/mm2, non-brisk lymphoid infiltrate, no ulceration or regression.  The lesion extends to the base and peripheral margins of the specimen. Tumor stage: pT1a\par \par Mr. SAUCEDA reports a longstanding history of sun exposure without the use of sunblock. History of sunburns.  Denies any other concerning lesions or masses. Denies bleeding or pruritic moles. Denies pain or constitutional symptoms.\par \par He is retired [employer] (retired in ) and now works for the [employer].  Pt. was involved at [facility]. \par \par PMH: [place] (occasionally wears hearing aids),asthma, DANA (on CPAP), GERD, BPH, BCC (did not require Mohs or excision). \par PSH: None\par Social Hx: , , 2 boys, 3 grandchildren, never a smoker, social alcohol use. \par Family Hx: Father with lung cancer ( @ age 51)- had exposure to asbestos; Son with prostate cancer @ age 45 (A&W); Brother with lung cancer (smoker- );  Another brother with bladder cancer (); Another brother with bladder vs. prostate cancer (); Sister with kidney cancer (A&W).  \par \par Derm: Dr. Jean Marie Baez\par \par INTERVAL HISTORY:\par ****OFFICE PROCEDURE 2021-  S/p WLE of right rib cage T1 (0.4 mm) melanoma. \par ***FINAL PATHOLOGY - Scar secondary to previous procedure with no residual melanocytic lesion.  Additional medial and lateral margins negative for tumor. \par \par 2021- Denies pain, fever or chills. Reports redness at the incision site. \par \par 10/7/2021- Completed Augmentin with improvement.  States there is an open area in the wound which drains scant yellow drainage. Denies fever or chills. \par \par 2022- Pt. is scheduled to see Dr. Baez next month for a full body skin check.   Denies new skin lesions or masses. No bleeding or pruritic moles.  Doing well. \par \par 22- Continues to see derm every 4 months, last visit in 2022, no new biopsies taken.  Denies any new or changing skin lesions.\par \par 3/16/23- . Denies bleeding or pruritic moles. Denies pain or constitutional changes. Continues to follow up with dermatology with 5 new biopsies taken. 1 lesion suspicious planning for excision per dermatologist

## 2023-03-27 NOTE — CONSULT LETTER
[Dear  ___] : Dear  [unfilled], [Courtesy Letter:] : I had the pleasure of seeing your patient, [unfilled], in my office today. [Consult Closing:] : Thank you very much for allowing me to participate in the care of this patient.  If you have any questions, please do not hesitate to contact me. [Please see my note below.] : Please see my note below. [Sincerely,] : Sincerely, [FreeTextEntry3] : Cesar Watters MD, MPH, FACS, FSSO\par , [facility] General Surgical Oncology Fellowship\par [facility] [place]\par Associate Professor of Surgery\par [employer] at [facility]

## 2023-03-27 NOTE — ADDENDUM
[FreeTextEntry1] : Documented by Patricia Viramontes acting as scribe for Dr. Watters on 03/16/2023 \par \par All Medical record entries made by the Scribe were at my, Dr. Watters's , direction and personally dictated by me on 03/16/2023 . I have reviewed the chart and agree that the record accurately reflects my personal performance of the history, physical exam, assessment and plan. I have also personally directed, reviewed, and agreed with the discharge instructions.

## 2023-03-27 NOTE — PHYSICAL EXAM
[Normal] : supple, no neck mass and thyroid not enlarged [Normal Neck Lymph Nodes] : normal neck lymph nodes  [Normal Supraclavicular Lymph Nodes] : normal supraclavicular lymph nodes [Normal Axillary Lymph Nodes] : normal axillary lymph nodes [Normal] : oriented to person, place and time, with appropriate affect [de-identified] : Healed, hypertrophic right chest wall incision with no evidence of recurrence, 5 new biopsys

## 2023-04-07 ENCOUNTER — APPOINTMENT (OUTPATIENT)
Dept: ORTHOPEDIC SURGERY | Facility: CLINIC | Age: 73
End: 2023-04-07
Payer: MEDICARE

## 2023-04-07 VITALS — WEIGHT: 220 LBS | HEIGHT: 71 IN | BODY MASS INDEX: 30.8 KG/M2

## 2023-04-07 DIAGNOSIS — S46.811A STRAIN OF OTHER MUSCLES, FASCIA AND TENDONS AT SHOULDER AND UPPER ARM LEVEL, RIGHT ARM, INITIAL ENCOUNTER: ICD-10-CM

## 2023-04-07 DIAGNOSIS — M75.41 IMPINGEMENT SYNDROME OF RIGHT SHOULDER: ICD-10-CM

## 2023-04-07 DIAGNOSIS — M75.111 INCOMPLETE ROTATOR CUFF TEAR OR RUPTURE OF RIGHT SHOULDER, NOT SPECIFIED AS TRAUMATIC: ICD-10-CM

## 2023-04-07 DIAGNOSIS — S43.003A UNSPECIFIED SUBLUXATION OF UNSPECIFIED SHOULDER JOINT, INITIAL ENCOUNTER: ICD-10-CM

## 2023-04-07 PROCEDURE — 99214 OFFICE O/P EST MOD 30 MIN: CPT

## 2023-04-07 NOTE — HISTORY OF PRESENT ILLNESS
[1] : 2 [0] : 0 [de-identified] : pt is here today for a follow up for his right shoulder. pt states he has been improving since last visit  [FreeTextEntry1] : right shoulder  [de-identified] : home exercises

## 2023-04-07 NOTE — PHYSICAL EXAM
[Right] : right shoulder [Moderate] : moderate [4 ___] : forward flexion 4[unfilled]/5 [4___] : external rotation 4[unfilled]/5 [Left] : left shoulder [Sitting] : sitting [] : no ecchymosis [de-identified] : Trace biceps active.  [FreeTextEntry9] : IR to T10. [TWNoteComboBox6] : internal rotation L2 [TWNoteComboBox4] : passive forward flexion 165 degrees [de-identified] : external rotation 75 degrees

## 2023-04-07 NOTE — ASSESSMENT
[FreeTextEntry1] : Course outlined. \par He is pleased with his progress. \par He can transition to HEP. \par If symptoms worsen we can inject once more. \par questions answered.\par RTO as needed.\par \par The documentation recorded by the scribe accurately reflects the service I personally performed and the decisions made by me.\par Entered by Jamar Arita acting as scribe.\par \par Patient seen by Dayday Mathew M.D.\par Dr. Dayday Mathew\par Shoulder Surgery\par

## 2023-04-07 NOTE — REASON FOR VISIT
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  He has been doing a lot of work, including lifting and carrying.  Sleeping can be sore.  Reaching is uncomfortable.  OTC meds don't help much.  No numbness.  He did OK with PT, but now feels the same.  He has done the MDP and Aleve.  He continues to have limitations. The SA injection from 10/31/22 helped for a few weeks. He has increased his activity levels recently. He feels about 70% better. He is feeling much better. He had cataract surgery and was advised to stay at home. Doing HEP. He is doing light duty at home

## 2023-09-13 ENCOUNTER — APPOINTMENT (OUTPATIENT)
Dept: SURGICAL ONCOLOGY | Facility: CLINIC | Age: 73
End: 2023-09-13
Payer: MEDICARE

## 2023-09-13 VITALS
OXYGEN SATURATION: 95 % | HEIGHT: 71 IN | WEIGHT: 228 LBS | TEMPERATURE: 97.2 F | BODY MASS INDEX: 31.92 KG/M2 | HEART RATE: 66 BPM | DIASTOLIC BLOOD PRESSURE: 81 MMHG | SYSTOLIC BLOOD PRESSURE: 135 MMHG

## 2023-09-13 PROCEDURE — 99024 POSTOP FOLLOW-UP VISIT: CPT

## 2023-09-14 ENCOUNTER — APPOINTMENT (OUTPATIENT)
Dept: SURGICAL ONCOLOGY | Facility: CLINIC | Age: 73
End: 2023-09-14

## 2023-12-13 ENCOUNTER — APPOINTMENT (OUTPATIENT)
Dept: SURGICAL ONCOLOGY | Facility: CLINIC | Age: 73
End: 2023-12-13
Payer: MEDICARE

## 2023-12-13 VITALS
DIASTOLIC BLOOD PRESSURE: 84 MMHG | TEMPERATURE: 98.3 F | HEART RATE: 76 BPM | BODY MASS INDEX: 31.64 KG/M2 | HEIGHT: 71 IN | SYSTOLIC BLOOD PRESSURE: 146 MMHG | OXYGEN SATURATION: 93 % | WEIGHT: 226 LBS

## 2023-12-13 DIAGNOSIS — Z85.820 ENCOUNTER FOR FOLLOW-UP EXAMINATION AFTER COMPLETED TREATMENT FOR MALIGNANT NEOPLASM: ICD-10-CM

## 2023-12-13 DIAGNOSIS — Z08 ENCOUNTER FOR FOLLOW-UP EXAMINATION AFTER COMPLETED TREATMENT FOR MALIGNANT NEOPLASM: ICD-10-CM

## 2023-12-13 DIAGNOSIS — Z09 ENCOUNTER FOR FOLLOW-UP EXAMINATION AFTER COMPLETED TREATMENT FOR CONDITIONS OTHER THAN MALIGNANT NEOPLASM: ICD-10-CM

## 2023-12-13 PROCEDURE — 99024 POSTOP FOLLOW-UP VISIT: CPT

## 2023-12-13 NOTE — ASSESSMENT
[FreeTextEntry1] : Follow-up surveillance of melanoma, encounter for (V67.9,V10.82) (Z08,Z85.820) Surgical follow-up care (V67.00) (Z09) Mr. DIXON SAUCEDA is a 71 year old male with PMHx BCC, BPH, DANA, Asthma who underwent a right rib cage shave biopsy on 7/30/2021 revealing Malignant melanoma, superficial spreading type, 0.4 mm, mitotic rate 0/mm2, non-brisk lymphoid infiltrate, no ulceration or regression. The lesion extends to the base and peripheral margins of the specimen. Tumor stage: pT1a  ****OFFICE PROCEDURE 9/14/2021- S/p WLE of right rib cage T1 (0.4 mm) melanoma. ***FINAL PATHOLOGY - Scar secondary to previous procedure with no residual melanocytic lesion. Additional medial and lateral margins negative for tumor.  Postoperative cellulitis - now resolved. No evidence of recurrence  PLAN: 1) RTO in 1 year 2) Continue f/u with dermatology for full body skin checks.

## 2023-12-13 NOTE — PHYSICAL EXAM
[Normal] : supple, no neck mass and thyroid not enlarged [Normal Neck Lymph Nodes] : normal neck lymph nodes  [Normal Supraclavicular Lymph Nodes] : normal supraclavicular lymph nodes [Normal Axillary Lymph Nodes] : normal axillary lymph nodes [Normal] : oriented to person, place and time, with appropriate affect [de-identified] : Healed, hypertrophic right chest wall incision with no evidence of recurrence

## 2023-12-13 NOTE — HISTORY OF PRESENT ILLNESS
[de-identified] : Mr. DIXON SAUCEDA  is a 72 year  old male  presenting for a follow up visit.  Pt. was seen in initial consultation on 2021 for newly diagnosed melanoma, referred by Dr. Jean Marie Baez.   Patient underwent a routine skin check with Dr. Baez and underwent a shave biopsy of a right rib cage skin lesion on 2021.  Pathology revealed malignant melanoma, superficial spreading type, 0.4 mm, mitotic rate 0/mm2, non-brisk lymphoid infiltrate, no ulceration or regression.  The lesion extends to the base and peripheral margins of the specimen. Tumor stage: pT1a  Mr. SAUCEDA reports a longstanding history of sun exposure without the use of sunblock. History of sunburns.  Denies any other concerning lesions or masses. Denies bleeding or pruritic moles. Denies pain or constitutional symptoms.  He is retired [employer] (retired in ) and now works for the [employer].  Pt. was involved at [facility].   PMH: [place] (occasionally wears hearing aids),asthma, DANA (on CPAP), GERD, BPH, BCC (did not require Mohs or excision).  PSH: None Social Hx: , , 2 boys, 3 grandchildren, never a smoker, social alcohol use.  Family Hx: Father with lung cancer ( @ age 51)- had exposure to asbestos; Son with prostate cancer @ age 45 (A&W); Brother with lung cancer (smoker- );  Another brother with bladder cancer (); Another brother with bladder vs. prostate cancer (); Sister with kidney cancer (A&W).    Derm: Dr. Jean Marie Baez  INTERVAL HISTORY: ****OFFICE PROCEDURE 2021-  S/p WLE of right rib cage T1 (0.4 mm) melanoma.  ***FINAL PATHOLOGY - Scar secondary to previous procedure with no residual melanocytic lesion.  Additional medial and lateral margins negative for tumor.   2021- Denies pain, fever or chills. Reports redness at the incision site.   10/7/2021- Completed Augmentin with improvement.  States there is an open area in the wound which drains scant yellow drainage. Denies fever or chills.   2022- Pt. is scheduled to see Dr. Baez next month for a full body skin check.   Denies new skin lesions or masses. No bleeding or pruritic moles.  Doing well.   22- Continues to see derm every 4 months, last visit in 2022, no new biopsies taken.  Denies any new or changing skin lesions.  3/16/23- . Denies bleeding or pruritic moles. Denies pain or constitutional changes. Continues to follow up with dermatology with 5 new biopsies taken. 1 lesion suspicious planning for excision per dermatologist   -23- Denies bleeding or pruritic moles. Denies pain or constitutional changes. Continues to follow up with dermatology - previous 1 suspicious lesion was benign. Overall feels well and denies any s/s.   23- Denies bleeding or pruritic moles. Denies pain or constitutional changes. Continues to follow up with dermatology - . Overall feels well and denies any s/s. In good spirits and planning on upcoming cruisies with spouse

## 2024-07-22 ENCOUNTER — APPOINTMENT (OUTPATIENT)
Dept: ORTHOPEDIC SURGERY | Facility: CLINIC | Age: 74
End: 2024-07-22
Payer: MEDICARE

## 2024-07-22 VITALS — HEIGHT: 71 IN | WEIGHT: 220 LBS | BODY MASS INDEX: 30.8 KG/M2

## 2024-07-22 DIAGNOSIS — E66.9 OBESITY, UNSPECIFIED: ICD-10-CM

## 2024-07-22 PROCEDURE — 73564 X-RAY EXAM KNEE 4 OR MORE: CPT | Mod: 50

## 2024-07-22 PROCEDURE — 20611 DRAIN/INJ JOINT/BURSA W/US: CPT | Mod: 50

## 2024-07-22 PROCEDURE — 99214 OFFICE O/P EST MOD 30 MIN: CPT | Mod: 25

## 2024-07-22 RX ORDER — MELOXICAM 15 MG/1
15 TABLET ORAL DAILY
Qty: 30 | Refills: 1 | Status: ACTIVE | COMMUNITY
Start: 2024-07-22 | End: 2024-09-20

## 2024-07-22 NOTE — IMAGING
[de-identified] : Knee Exam:  Inspection: Varus deformity, mild effusion, no warmth, no ecchymosis Palpation: Medial joint line tenderness to palpation, Negative Mario Range of motion: 0-130 with associated crepitus Strength: 5/5 quadriceps and hamstring strength Special testing: Ligamentously stable Motor and sensory intact distally Gait: Non antalgic gait   [Bilateral] : knee bilaterally [All Views] : anteroposterior, lateral, skyline, and anteroposterior standing [advanced tricompartmental OA with medial compartment narrowing and varus alignment] : advanced tricompartmental OA with medial compartment narrowing and varus alignment

## 2024-07-22 NOTE — PROCEDURE
[FreeTextEntry3] : Large joint injection was performed of the BILAT knee.  The indication for this procedure was pain, inflammation and x-ray evidence of Osteoarthritis on this or prior visit. The site was prepped with alcohol, betadine, ethyl chloride sprayed topically and sterile technique used.  An injection of ORTHOVISC 2ml, series #1 was used.   Patient was advised to call if redness, pain or fever occur, apply ice for 15 minutes out of every hour for the next 12-24 hours as tolerated and patient was advised to rest the joint(s) for 2 days. Patient has tried OTC's including aspirin, Ibuprofen, Aleve, etc or prescription NSAIDS, and/or exercises at home and/or physical therapy without satisfactory response, patient had decreased mobility in the joint and the risks benefits, and alternatives have been discussed, and verbal consent was obtained.  Ultrasound guidance was indicated for this patient due to OBESITY. All ultrasound images have been permanently captured and stored accordingly in our picture archiving and communication system. Visualization of the needle and placement of injection was performed without complication.

## 2024-07-22 NOTE — HISTORY OF PRESENT ILLNESS
[6] : 6 [0] : 0 [Dull/Aching] : dull/aching [Intermittent] : intermittent [Rest] : rest [Stairs] : stairs [de-identified] : 7.22.24 New patient here for bilateral knee pain. Pain started years ago, no injury. More pain going up and down stairs.

## 2024-07-29 ENCOUNTER — APPOINTMENT (OUTPATIENT)
Dept: ORTHOPEDIC SURGERY | Facility: CLINIC | Age: 74
End: 2024-07-29
Payer: MEDICARE

## 2024-07-29 PROBLEM — M17.11 PRIMARY OSTEOARTHRITIS OF RIGHT KNEE: Status: ACTIVE | Noted: 2024-07-22

## 2024-07-29 PROBLEM — M17.12 PRIMARY OSTEOARTHRITIS OF LEFT KNEE: Status: ACTIVE | Noted: 2024-07-22

## 2024-07-29 PROCEDURE — 20610 DRAIN/INJ JOINT/BURSA W/O US: CPT | Mod: 50

## 2024-07-29 NOTE — PROCEDURE
[FreeTextEntry3] : Large joint injection was performed of the BILAT knee.  The indication for this procedure was pain, inflammation and x-ray evidence of Osteoarthritis on this or prior visit. The site was prepped with alcohol, betadine, ethyl chloride sprayed topically and sterile technique used.  An injection of ORTHOVISC 2ml, series #2 was used.   Patient was advised to call if redness, pain or fever occur, apply ice for 15 minutes out of every hour for the next 12-24 hours as tolerated and patient was advised to rest the joint(s) for 2 days. Patient has tried OTC's including aspirin, Ibuprofen, Aleve, etc or prescription NSAIDS, and/or exercises at home and/or physical therapy without satisfactory response, patient had decreased mobility in the joint and the risks benefits, and alternatives have been discussed, and verbal consent was obtained.

## 2024-07-29 NOTE — HISTORY OF PRESENT ILLNESS
[6] : 6 [0] : 0 [Dull/Aching] : dull/aching [Intermittent] : intermittent [Rest] : rest [Stairs] : stairs [de-identified] : 7.22.24 New patient here for bilateral knee pain. Pain started years ago, no injury. More pain going up and down stairs.   7.29.24 Patient here for bilateral knee pain. Orthovisc #2

## 2024-07-29 NOTE — ASSESSMENT
[FreeTextEntry1] : Here for b/l knee orthovisc #2 - tolerated procedure well  All questions answered Can continue meloxicam prn RTC in 1 week to continue series  I am working today under the supervision of Dr. Palacio and I am following the plan of care of Dr. Palacio as described by him on this date. Progress note completed by Nay Lopez PA-C under the supervision of Dr. Palacio.

## 2024-07-29 NOTE — IMAGING
[de-identified] : Bilateral Knee Exam:  Inspection: Varus deformity, mild effusion, no warmth, no ecchymosis Palpation: Medial joint line tenderness to palpation, Negative Mario Range of motion: 0-130 with associated crepitus Strength: 5/5 quadriceps and hamstring strength Special testing: Ligamentously stable Motor and sensory intact distally Gait: Non antalgic gait

## 2024-08-07 ENCOUNTER — APPOINTMENT (OUTPATIENT)
Dept: ORTHOPEDIC SURGERY | Facility: CLINIC | Age: 74
End: 2024-08-07

## 2024-08-07 PROCEDURE — 20610 DRAIN/INJ JOINT/BURSA W/O US: CPT | Mod: 50

## 2024-08-07 NOTE — PROCEDURE
[FreeTextEntry3] : Large joint injection was performed of the BILAT knee.  The indication for this procedure was pain, inflammation and x-ray evidence of Osteoarthritis on this or prior visit. The site was prepped with alcohol, betadine, ethyl chloride sprayed topically and sterile technique used.  An injection of ORTHOVISC 2ml, series #3 was used.   Patient was advised to call if redness, pain or fever occur, apply ice for 15 minutes out of every hour for the next 12-24 hours as tolerated and patient was advised to rest the joint(s) for 2 days. Patient has tried OTC's including aspirin, Ibuprofen, Aleve, etc or prescription NSAIDS, and/or exercises at home and/or physical therapy without satisfactory response, patient had decreased mobility in the joint and the risks benefits, and alternatives have been discussed, and verbal consent was obtained.

## 2024-08-07 NOTE — IMAGING
[de-identified] : Bilateral Knee Exam:  Inspection: Varus deformity, mild effusion, no warmth, no ecchymosis Palpation: Medial joint line tenderness to palpation, Negative Mario Range of motion: 0-130 with associated crepitus Strength: 5/5 quadriceps and hamstring strength Special testing: Ligamentously stable Motor and sensory intact distally Gait: Non antalgic gait

## 2024-08-07 NOTE — HISTORY OF PRESENT ILLNESS
[6] : 6 [0] : 0 [Dull/Aching] : dull/aching [Intermittent] : intermittent [Rest] : rest [Stairs] : stairs [de-identified] : 7.22.24 New patient here for bilateral knee pain. Pain started years ago, no injury. More pain going up and down stairs.   7.29.24 Patient here for bilateral knee pain. Orthovisc #2  08/07/2024: Pt is here for inj 3 of Orthovisc in both knees. Pt is doing PT 2x a week, going well.

## 2024-08-07 NOTE — ASSESSMENT
[FreeTextEntry1] : B/l knee orthovisc #3 today - tolerated well  Discussed post procedure recommendations  RTC 1 week   I am working today under the supervision of Dr. Palacio and I am following the plan of care of Dr. Palacio as described by him on this date.  Progress note completed by Sharon Feng PA-C under the supervision of Dr. Palacio.

## 2024-08-16 ENCOUNTER — APPOINTMENT (OUTPATIENT)
Dept: ORTHOPEDIC SURGERY | Facility: CLINIC | Age: 74
End: 2024-08-16
Payer: MEDICARE

## 2024-08-16 VITALS — WEIGHT: 220 LBS | HEIGHT: 71 IN | BODY MASS INDEX: 30.8 KG/M2

## 2024-08-16 DIAGNOSIS — M17.12 UNILATERAL PRIMARY OSTEOARTHRITIS, LEFT KNEE: ICD-10-CM

## 2024-08-16 DIAGNOSIS — M17.11 UNILATERAL PRIMARY OSTEOARTHRITIS, RIGHT KNEE: ICD-10-CM

## 2024-08-16 PROCEDURE — 20610 DRAIN/INJ JOINT/BURSA W/O US: CPT | Mod: 50

## 2024-08-16 NOTE — PROCEDURE
[FreeTextEntry3] : Large joint injection was performed of the BILAT knee.  The indication for this procedure was pain, inflammation and x-ray evidence of Osteoarthritis on this or prior visit. The site was prepped with alcohol, betadine, ethyl chloride sprayed topically and sterile technique used.  An injection of ORTHOVISC 2ml, series #4 was used.   Patient was advised to call if redness, pain or fever occur, apply ice for 15 minutes out of every hour for the next 12-24 hours as tolerated and patient was advised to rest the joint(s) for 2 days. Patient has tried OTC's including aspirin, Ibuprofen, Aleve, etc or prescription NSAIDS, and/or exercises at home and/or physical therapy without satisfactory response, patient had decreased mobility in the joint and the risks benefits, and alternatives have been discussed, and verbal consent was obtained.

## 2024-08-16 NOTE — HISTORY OF PRESENT ILLNESS
[6] : 6 [0] : 0 [Dull/Aching] : dull/aching [Intermittent] : intermittent [Rest] : rest [Stairs] : stairs [de-identified] : 7.22.24 New patient here for bilateral knee pain. Pain started years ago, no injury. More pain going up and down stairs.   7.29.24 Patient here for bilateral knee pain. Orthovisc #2  08/07/2024: Pt is here for inj 3 of Orthovisc in both knees. Pt is doing PT 2x a week, going well.   8/16/24: here for bilateral knee Orthovisc #4.

## 2024-08-16 NOTE — IMAGING
[de-identified] : Bilateral Knee Exam:  Inspection: Varus deformity, mild effusion, no warmth, no ecchymosis Palpation: Medial joint line tenderness to palpation, Negative Mario Range of motion: 0-130 with associated crepitus Strength: 5/5 quadriceps and hamstring strength Special testing: Ligamentously stable Motor and sensory intact distally Gait: Non antalgic gait

## 2024-08-16 NOTE — ASSESSMENT
[FreeTextEntry1] : B/l knee orthovisc #4 today - tolerated well  Discussed post procedure recommendations  Able to repeat on or after 2/17/25.  RTC prn  I am working today under the supervision of Dr. Palacio and I am following the plan of care of Dr. Palacio as described by him on this date.  Progress note completed by Sharon Feng PA-C under the supervision of Dr. Palacio.

## 2024-09-25 ENCOUNTER — APPOINTMENT (OUTPATIENT)
Dept: ORTHOPEDIC SURGERY | Facility: CLINIC | Age: 74
End: 2024-09-25

## 2024-09-27 ENCOUNTER — APPOINTMENT (OUTPATIENT)
Dept: ORTHOPEDIC SURGERY | Facility: CLINIC | Age: 74
End: 2024-09-27
Payer: MEDICARE

## 2024-09-27 DIAGNOSIS — M17.11 UNILATERAL PRIMARY OSTEOARTHRITIS, RIGHT KNEE: ICD-10-CM

## 2024-09-27 DIAGNOSIS — M17.12 UNILATERAL PRIMARY OSTEOARTHRITIS, LEFT KNEE: ICD-10-CM

## 2024-09-27 PROCEDURE — J3490M: CUSTOM | Mod: NC,JZ

## 2024-09-27 PROCEDURE — 20610 DRAIN/INJ JOINT/BURSA W/O US: CPT | Mod: 50

## 2024-09-27 NOTE — IMAGING
[de-identified] : Bilateral Knee Exam:  Inspection: Varus deformity, mild effusion, no warmth, no ecchymosis Palpation: Medial joint line tenderness to palpation, Negative Mario Range of motion: 0-130 with associated crepitus Strength: 5/5 quadriceps and hamstring strength Special testing: Ligamentously stable Motor and sensory intact distally Gait: Non antalgic gait

## 2024-09-27 NOTE — ASSESSMENT
[FreeTextEntry1] : Acute exacerbation of bilateral knee OA after trip to [place]. Finished euflexxa series in August with good relief - too soon for repeat. Discussed availability of CSI today and patient wishes to proceed. B/l knee CSI administered today - tolerated well. Discussed post procedure recommendations. Can continue with physical therapy as needed. RTC when ready for gel injections.   I am working today under the supervision of Dr. Palacio and I am following the plan of care of Dr. Palacio as described by him on this date.  Progress note completed by Sharon Feng PA-C under the supervision of Dr. Palacio.

## 2024-09-27 NOTE — PROCEDURE
[FreeTextEntry3] : - Large joint injection was performed of the Bilateral knee.  - The indication for this procedure was pain and inflammation. Patient has tried OTC's including aspirin, Ibuprofen, Aleve, etc or prescription NSAIDS, and/or exercises at home and/or physical therapy without satisfactory response, patient had decreased mobility in the joint and the risks benefits, and alternatives have been discussed, and verbal consent was obtained. The site was prepped with alcohol, betadine, ethyl chloride sprayed topically and sterile technique used.  - An injection of Betamethasone 2cc; Marcaine 5cc injected. - Patient was advised to call if redness, pain or fever occur, apply ice for 15 minutes out of every hour for the next 12-24 hours as tolerated and patient was advised to rest the joint(s) for 2 days.

## 2024-09-27 NOTE — HISTORY OF PRESENT ILLNESS
[6] : 6 [0] : 0 [Dull/Aching] : dull/aching [Intermittent] : intermittent [Rest] : rest [Stairs] : stairs [de-identified] : 7.22.24 New patient here for bilateral knee pain. Pain started years ago, no injury. More pain going up and down stairs.   7.29.24 Patient here for bilateral knee pain. Orthovisc #2  08/07/2024: Pt is here for inj 3 of Orthovisc in both knees. Pt is doing PT 2x a week, going well.   8/16/24: here for bilateral knee Orthovisc #4.   9/27/24: here for bilateral knee pain. Completed Orthovisc series on 8/16/24 with relief. Increased pain recently. Inquiring about CSI.

## 2024-12-06 RX ORDER — MELOXICAM 15 MG/1
15 TABLET ORAL
Qty: 30 | Refills: 1 | Status: ACTIVE | COMMUNITY
Start: 2024-12-06 | End: 1900-01-01

## 2024-12-13 ENCOUNTER — APPOINTMENT (OUTPATIENT)
Dept: SURGICAL ONCOLOGY | Facility: CLINIC | Age: 74
End: 2024-12-13
Payer: MEDICARE

## 2024-12-13 VITALS
DIASTOLIC BLOOD PRESSURE: 74 MMHG | BODY MASS INDEX: 31.5 KG/M2 | WEIGHT: 225 LBS | HEART RATE: 70 BPM | TEMPERATURE: 97.2 F | HEIGHT: 71 IN | OXYGEN SATURATION: 97 % | SYSTOLIC BLOOD PRESSURE: 128 MMHG

## 2024-12-13 DIAGNOSIS — Z85.820 ENCOUNTER FOR FOLLOW-UP EXAMINATION AFTER COMPLETED TREATMENT FOR MALIGNANT NEOPLASM: ICD-10-CM

## 2024-12-13 DIAGNOSIS — Z08 ENCOUNTER FOR FOLLOW-UP EXAMINATION AFTER COMPLETED TREATMENT FOR MALIGNANT NEOPLASM: ICD-10-CM

## 2024-12-13 DIAGNOSIS — Z09 ENCOUNTER FOR FOLLOW-UP EXAMINATION AFTER COMPLETED TREATMENT FOR CONDITIONS OTHER THAN MALIGNANT NEOPLASM: ICD-10-CM

## 2024-12-13 PROCEDURE — 99212 OFFICE O/P EST SF 10 MIN: CPT

## 2025-01-14 ENCOUNTER — NON-APPOINTMENT (OUTPATIENT)
Age: 75
End: 2025-01-14

## 2025-01-14 DIAGNOSIS — K21.9 GASTRO-ESOPHAGEAL REFLUX DISEASE W/OUT ESOPHAGITIS: ICD-10-CM

## 2025-01-14 DIAGNOSIS — Z80.9 FAMILY HISTORY OF MALIGNANT NEOPLASM, UNSPECIFIED: ICD-10-CM

## 2025-01-14 DIAGNOSIS — Z86.79 PERSONAL HISTORY OF OTHER DISEASES OF THE CIRCULATORY SYSTEM: ICD-10-CM

## 2025-01-14 DIAGNOSIS — M79.89 OTHER SPECIFIED SOFT TISSUE DISORDERS: ICD-10-CM

## 2025-01-14 DIAGNOSIS — G47.30 SLEEP APNEA, UNSPECIFIED: ICD-10-CM

## 2025-01-14 RX ORDER — OMEPRAZOLE 40 MG/1
40 CAPSULE, DELAYED RELEASE ORAL
Refills: 0 | Status: ACTIVE | COMMUNITY

## 2025-01-14 RX ORDER — MONTELUKAST 10 MG/1
10 TABLET, FILM COATED ORAL
Refills: 0 | Status: ACTIVE | COMMUNITY

## 2025-02-20 ENCOUNTER — APPOINTMENT (OUTPATIENT)
Dept: ORTHOPEDIC SURGERY | Facility: CLINIC | Age: 75
End: 2025-02-20

## 2025-03-07 ENCOUNTER — APPOINTMENT (OUTPATIENT)
Dept: ORTHOPEDIC SURGERY | Facility: CLINIC | Age: 75
End: 2025-03-07
Payer: MEDICARE

## 2025-03-07 VITALS — BODY MASS INDEX: 31.5 KG/M2 | WEIGHT: 225 LBS | HEIGHT: 71 IN

## 2025-03-07 DIAGNOSIS — M17.11 UNILATERAL PRIMARY OSTEOARTHRITIS, RIGHT KNEE: ICD-10-CM

## 2025-03-07 DIAGNOSIS — M17.12 UNILATERAL PRIMARY OSTEOARTHRITIS, LEFT KNEE: ICD-10-CM

## 2025-03-07 DIAGNOSIS — E66.9 OBESITY, UNSPECIFIED: ICD-10-CM

## 2025-03-07 PROCEDURE — 20611 DRAIN/INJ JOINT/BURSA W/US: CPT | Mod: 50

## 2025-03-07 PROCEDURE — 99214 OFFICE O/P EST MOD 30 MIN: CPT | Mod: 25

## 2025-03-07 PROCEDURE — J3490M: CUSTOM | Mod: NC,JZ

## 2025-08-18 ENCOUNTER — APPOINTMENT (OUTPATIENT)
Dept: ORTHOPEDIC SURGERY | Facility: CLINIC | Age: 75
End: 2025-08-18
Payer: MEDICARE

## 2025-08-18 VITALS — WEIGHT: 225 LBS | HEIGHT: 71 IN | BODY MASS INDEX: 31.5 KG/M2

## 2025-08-18 PROBLEM — M17.0 PRIMARY LOCALIZED OSTEOARTHRITIS OF BOTH KNEES: Status: ACTIVE | Noted: 2025-08-18

## 2025-08-18 PROCEDURE — 99213 OFFICE O/P EST LOW 20 MIN: CPT | Mod: 25

## 2025-08-18 PROCEDURE — 20611 DRAIN/INJ JOINT/BURSA W/US: CPT | Mod: 50

## 2025-08-25 ENCOUNTER — APPOINTMENT (OUTPATIENT)
Dept: ORTHOPEDIC SURGERY | Facility: CLINIC | Age: 75
End: 2025-08-25
Payer: MEDICARE

## 2025-08-25 VITALS — HEIGHT: 71 IN | BODY MASS INDEX: 31.5 KG/M2 | WEIGHT: 225 LBS

## 2025-08-25 PROCEDURE — 20611 DRAIN/INJ JOINT/BURSA W/US: CPT | Mod: 50

## 2025-09-05 ENCOUNTER — APPOINTMENT (OUTPATIENT)
Dept: ORTHOPEDIC SURGERY | Facility: CLINIC | Age: 75
End: 2025-09-05
Payer: MEDICARE

## 2025-09-05 DIAGNOSIS — E66.9 OBESITY, UNSPECIFIED: ICD-10-CM

## 2025-09-05 DIAGNOSIS — M17.0 BILATERAL PRIMARY OSTEOARTHRITIS OF KNEE: ICD-10-CM

## 2025-09-05 PROCEDURE — 20611 DRAIN/INJ JOINT/BURSA W/US: CPT | Mod: 50
